# Patient Record
Sex: MALE | Race: WHITE | URBAN - METROPOLITAN AREA
[De-identification: names, ages, dates, MRNs, and addresses within clinical notes are randomized per-mention and may not be internally consistent; named-entity substitution may affect disease eponyms.]

---
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
## 2024-11-26 ENCOUNTER — COMPREHENSIVE EXAM (OUTPATIENT)
Facility: LOCATION | Age: 19
End: 2024-11-26

## 2024-11-26 DIAGNOSIS — H01.02A: ICD-10-CM

## 2024-11-26 DIAGNOSIS — H52.223: ICD-10-CM

## 2024-11-26 DIAGNOSIS — H01.02B: ICD-10-CM

## 2024-11-26 PROCEDURE — 92014CEP ESTABLISHED PATIENT- EMPLOYEE ROUTINE COMP EXAM
